# Patient Record
Sex: FEMALE | Race: WHITE
[De-identification: names, ages, dates, MRNs, and addresses within clinical notes are randomized per-mention and may not be internally consistent; named-entity substitution may affect disease eponyms.]

---

## 2017-02-25 ENCOUNTER — HOSPITAL ENCOUNTER (EMERGENCY)
Dept: HOSPITAL 80 - FED | Age: 27
Discharge: HOME | End: 2017-02-25
Payer: COMMERCIAL

## 2017-02-25 VITALS
DIASTOLIC BLOOD PRESSURE: 84 MMHG | TEMPERATURE: 98.1 F | RESPIRATION RATE: 20 BRPM | HEART RATE: 89 BPM | SYSTOLIC BLOOD PRESSURE: 115 MMHG | OXYGEN SATURATION: 97 %

## 2017-02-25 DIAGNOSIS — R00.2: Primary | ICD-10-CM

## 2017-02-25 DIAGNOSIS — J45.909: ICD-10-CM

## 2017-02-25 LAB
% IMMATURE GRANULYOCYTES: 0.3 % (ref 0–1.1)
ABSOLUTE IMMATURE GRANULOCYTES: 0.04 10^3/UL (ref 0–0.1)
ABSOLUTE NRBC COUNT: 0 10^3/UL (ref 0–0.01)
ADD DIFF?: NO
ADD MORPH?: NO
ADD SCAN?: NO
ANION GAP SERPL CALC-SCNC: 10 MEQ/L (ref 8–16)
ATYPICAL LYMPHOCYTE FLAG: 0 (ref 0–99)
CALCIUM SERPL-MCNC: 9.7 MG/DL (ref 8.5–10.4)
CHLORIDE SERPL-SCNC: 107 MEQ/L (ref 97–110)
CO2 SERPL-SCNC: 22 MEQ/L (ref 22–31)
CREAT SERPL-MCNC: 0.7 MG/DL (ref 0.6–1)
ERYTHROCYTE [DISTWIDTH] IN BLOOD BY AUTOMATED COUNT: 13.3 % (ref 11.5–15.2)
FRAGMENT RBC FLAG: 0 (ref 0–99)
GFR SERPL CREATININE-BSD FRML MDRD: > 60 ML/MIN/{1.73_M2}
GLUCOSE SERPL-MCNC: 89 MG/DL (ref 70–100)
HCT VFR BLD CALC: 42 % (ref 38–47)
HGB BLD-MCNC: 13.9 G/DL (ref 12.6–16.3)
LEFT SHIFT FLG: 0 (ref 0–99)
LIPEMIA HEMOLYSIS FLAG: 80 (ref 0–99)
MCH RBC BLDCO QN: 28.8 PG (ref 27.9–34.1)
MCHC RBC AUTO-ENTMCNC: 33.1 G/DL (ref 32.4–36.7)
MCV RBC AUTO: 87.1 FL (ref 81.5–99.8)
NRBC-AUTO%: 0 % (ref 0–0.2)
PLATELET # BLD: 264 10^3/UL (ref 150–400)
PLATELET CLUMPS FLAG: 0 (ref 0–99)
PMV BLD AUTO: 11.4 FL (ref 8.7–11.7)
POTASSIUM SERPL-SCNC: 4 MEQ/L (ref 3.5–5.2)
RBC # BLD AUTO: 4.82 10^6/UL (ref 4.18–5.33)
SODIUM SERPL-SCNC: 139 MEQ/L (ref 134–144)
TROPONIN I SERPL-MCNC: < 0.012 NG/ML (ref 0–0.03)

## 2017-02-25 NOTE — EDPHY
H & P


Time Seen by Provider: 02/25/17 18:32


HPI/ROS: 





Chief complaint.  Palpitations





HPI.  Patient is a 27-year-old female with palpitations over the past month 

gradually worsening.  She notes that her heart beats quickly over 80 16 seconds 

and then seems to stop.  It repeats at some point later.  It has been worse the 

last few days.  It occurs at rest as well as with exertion.  She is 

uncomfortable from the sense of arrhythmia but otherwise no pressure or pain in 

her chest.  No shortness of breath, fever, cough.  No similar symptoms 

previously.  No unusual leg pain or swelling.





ROS


Constitutional.  no fever/chills, no weakness


Eyes.  no problems with vision


ENT.  no sore throat, no nasal drainage


Cardiovascular.  Palpitations


Respiratory.  no shortness of breath, no cough


Abdominal.  no abdominal pain, no nausea/vomiting, no diarrhea


.  no problems urinating


MS.  no calf pain/swelling, no neck/back pain, no joint pain


Skin.  no rash


Lymph.  no swollen glands


Neuro.  no headache, no dizziness, no difficulty walking or with speech


Past Medical/Surgical History: 





Past medical history significant for asthma and ovarian cysts


Social History: 





, nonsmoker, no alcohol


Smoking Status: Never smoked


Physical Exam: 





General Appearance:  Alert pleasant well-developed female mild distress vital 

signs are stable.


Eyes: Pupils equal and round no pallor or injection.


ENT, Mouth:  Mucous membranes are moist.


Respiratory:  There are no retractions, lungs are clear to auscultation.


Cardiovascular: Regular rate and rhythm.


Gastrointestinal:   Abdomen is soft and nontender, no masses, bowel sounds 

normal.


Neurological:  Awake and alert, sensory and motor exams grossly normal.


Skin: Warm and dry, no rashes.


Musculoskeletal:  Neck is supple nontender.


Extremities  symmetrical, full range of motion.


Psychiatric: Patient is oriented X 3, there is no agitation.


Constitutional: 


 Initial Vital Signs











Temperature (C)  36.4 C   02/25/17 18:20


 


Heart Rate  90   02/25/17 18:20


 


Respiratory Rate  16   02/25/17 18:20


 


Blood Pressure  125/87 H  02/25/17 18:20


 


O2 Sat (%)  98   02/25/17 18:20








 











O2 Delivery Mode               Room Air














Allergies/Adverse Reactions: 


 





No Known Allergies Allergy (Verified 02/25/17 18:18)


 








Home Medications: 














 Medication  Instructions  Recorded


 


Albuterol  02/25/17


 


Phentermine HCl  02/25/17














Medical Decision Making





- Diagnostics


EKG Interpretation: 





EKG interpreted by me shows normal sinus rhythm.  There is normal interval and 

axis.  QRS is normal there is no significant ST elevation or depression.  There 

is no arrhythmia or ectopic beats.  Rate is 86


Imaging: 





Chest x-ray interpreted by me is normal


Procedures: 





IV normal saline, monitor


ED Course/Re-evaluation: 





Patient's D-dimer is elevated.  The patient, her , and I discussed 

laboratory evaluation recommendation for CT of her chest.  She expresses 

understanding and agreement.  CT is ordered





On re-evaluation at 8:50 p.m. patient is stable. The patient, her , and 

I discussed imaging and lab results, we discussed treatment plan including 

criteria for return and importance of follow-up and further evaluation.  They 

expressed understanding and agreement


Differential Diagnosis: 





Patient has been having palpitations.  I have considered acute coronary syndrome

, hyperthyroidism, pulmonary embolus, anxiety





- Data Points


Laboratory Results: 


 Laboratory Results





 02/25/17 18:45 





 02/25/17 18:45 





 











  02/25/17 02/25/17 02/25/17





  18:45 18:45 18:45


 


WBC      





    


 


RBC      





    


 


Hgb      





    


 


Hct      





    


 


MCV      





    


 


MCH      





    


 


MCHC      





    


 


RDW      





    


 


Plt Count      





    


 


MPV      





    


 


Neut % (Auto)      





    


 


Lymph % (Auto)      





    


 


Mono % (Auto)      





    


 


Eos % (Auto)      





    


 


Baso % (Auto)      





    


 


Nucleat RBC Rel Count      





    


 


Absolute Neuts (auto)      





    


 


Absolute Lymphs (auto)      





    


 


Absolute Monos (auto)      





    


 


Absolute Eos (auto)      





    


 


Absolute Basos (auto)      





    


 


Absolute Nucleated RBC      





    


 


Immature Gran %      





    


 


Immature Gran #      





    


 


D-Dimer      0.52 ug/mLFEU H ug/mLFEU





     (0.00-0.50) 


 


Sodium    139 mEq/L mEq/L  





    (134-144)  


 


Potassium    4.0 mEq/L mEq/L  





    (3.5-5.2)  


 


Chloride    107 mEq/L mEq/L  





    ()  


 


Carbon Dioxide    22 mEq/l mEq/l  





    (22-31)  


 


Anion Gap    10 mEq/L mEq/L  





    (8-16)  


 


BUN    8 mg/dL mg/dL  





    (7-23)  


 


Creatinine    0.7 mg/dL mg/dL  





    (0.6-1.0)  


 


Estimated GFR    > 60   





    


 


Glucose    89 mg/dL mg/dL  





    ()  


 


Calcium    9.7 mg/dL mg/dL  





    (8.5-10.4)  


 


Troponin I    < 0.012 ng/mL ng/mL  





    (0-0.034)  


 


TSH    2.080 uIU/mL uIU/mL  





    (0.465-4.680)  


 


Beta HCG, Qual  NEGATIVE     





    














  02/25/17





  18:45


 


WBC  13.65 10^3/uL H 10^3/uL





   (3.80-9.50) 


 


RBC  4.82 10^6/uL 10^6/uL





   (4.18-5.33) 


 


Hgb  13.9 g/dL g/dL





   (12.6-16.3) 


 


Hct  42.0 % %





   (38.0-47.0) 


 


MCV  87.1 fL fL





   (81.5-99.8) 


 


MCH  28.8 pg pg





   (27.9-34.1) 


 


MCHC  33.1 g/dL g/dL





   (32.4-36.7) 


 


RDW  13.3 % %





   (11.5-15.2) 


 


Plt Count  264 10^3/uL 10^3/uL





   (150-400) 


 


MPV  11.4 fL fL





   (8.7-11.7) 


 


Neut % (Auto)  72.9 % %





   (39.3-74.2) 


 


Lymph % (Auto)  18.4 % %





   (15.0-45.0) 


 


Mono % (Auto)  5.6 % %





   (4.5-13.0) 


 


Eos % (Auto)  2.4 % %





   (0.6-7.6) 


 


Baso % (Auto)  0.4 % %





   (0.3-1.7) 


 


Nucleat RBC Rel Count  0.0 % %





   (0.0-0.2) 


 


Absolute Neuts (auto)  9.96 10^3/uL H 10^3/uL





   (1.70-6.50) 


 


Absolute Lymphs (auto)  2.51 10^3/uL 10^3/uL





   (1.00-3.00) 


 


Absolute Monos (auto)  0.76 10^3/uL 10^3/uL





   (0.30-0.80) 


 


Absolute Eos (auto)  0.33 10^3/uL 10^3/uL





   (0.03-0.40) 


 


Absolute Basos (auto)  0.05 10^3/uL 10^3/uL





   (0.02-0.10) 


 


Absolute Nucleated RBC  0.00 10^3/uL 10^3/uL





   (0-0.01) 


 


Immature Gran %  0.3 % %





   (0.0-1.1) 


 


Immature Gran #  0.04 10^3/uL 10^3/uL





   (0.00-0.10) 


 


D-Dimer  





  


 


Sodium  





  


 


Potassium  





  


 


Chloride  





  


 


Carbon Dioxide  





  


 


Anion Gap  





  


 


BUN  





  


 


Creatinine  





  


 


Estimated GFR  





  


 


Glucose  





  


 


Calcium  





  


 


Troponin I  





  


 


TSH  





  


 


Beta HCG, Qual  





  














Departure





- Departure


Disposition: Home, Routine, Self-Care


Clinical Impression: 


 Palpitations





Condition: Good


Instructions:  Palpitations (ED)


Additional Instructions: 


Return for worsening symptoms including chest discomfort or trouble breathing.  

On Monday morning call Cardiology to arrange further evaluation.  Return sooner 

over the weekend for worsening symptoms


Referrals: 


NONE *PRIMARY CARE P,. [Primary Care Provider] - As per Instructions


Truman Fraire MD [Medical Doctor] - As per Instructions

## 2017-02-25 NOTE — CPEKG
Heart Rate: 86

RR Interval: 698

P-R Interval: 140

QRSD Interval: 100

QT Interval: 372

QTC Interval: 445

P Axis: 67

QRS Axis: 79

T Wave Axis: 35

EKG Severity - BORDERLINE ECG -

EKG Impression: SINUS RHYTHM

EKG Impression: INFERIOR Q WAVES, PROBABLY NORMAL VARIATION

Electronically Signed By: Ric Mann 25-Feb-2017 21:47:23

## 2019-04-09 ENCOUNTER — HOSPITAL ENCOUNTER (EMERGENCY)
Dept: HOSPITAL 80 - FED | Age: 29
Discharge: HOME | End: 2019-04-09
Payer: COMMERCIAL

## 2019-04-09 VITALS — DIASTOLIC BLOOD PRESSURE: 70 MMHG | SYSTOLIC BLOOD PRESSURE: 122 MMHG

## 2019-04-09 DIAGNOSIS — R06.02: ICD-10-CM

## 2019-04-09 DIAGNOSIS — R07.89: Primary | ICD-10-CM

## 2019-04-09 NOTE — CPEKG
Test Reason : OPEN

Blood Pressure : ***/*** mmHG

Vent. Rate : 069 BPM     Atrial Rate : 070 BPM

   P-R Int : 151 ms          QRS Dur : 105 ms

    QT Int : 399 ms       P-R-T Axes : 046 068 027 degrees

   QTc Int : 428 ms

 

Sinus rhythm

 

Confirmed by Brendan Murcia ([id number]) on 4/9/2019 7:45:07 PM

 

Referred By: PHYSICIAN ED           Confirmed By:Brendan Murcia

## 2019-04-09 NOTE — EDPHY
H & P


Stated Complaint: hx PAC and mid chest tightness and feels PAC and diarrhea


Time Seen by Provider: 04/09/19 19:43


HPI/ROS: 





CHIEF COMPLAINT:  Chest pressure





HISTORY OF PRESENT ILLNESS:  The patient presents the ED with a 1 day history 

of chest pressure.  She reports that she is under fair amount of stress 

secondary to the recent purchase of a house and plans to travel with her 

family.  She described a sensation earlier today of chest discomfort.  She had 

mild shortness of breath.  Her symptoms have improved.  The patient has no 

history of exertional chest pain or shortness of breath.  The patient does have 

a history of PACs which typically manifest as a skipped beat.  The patient 

denies any fever, cough or congestion.  She denies asymmetric calf pain or 

swelling.  She denies pleuritic chest pain.  The patient reports that her 

father does have a history of coronary artery disease.  She does believe that 

he had a stent sometime in his mid 50s.





REVIEW OF SYSTEMS:


A comprehensive 10 point review of systems is otherwise negative aside from 

elements mentioned in the history of present illness.


Source: Patient





- Personal History


LMP (Females 10-55): 22-28 Days Ago


Current Tetanus/Diphtheria Vaccine: Yes


Current Tetanus Diphtheria and Acellular Pertussis (TDAP): Yes





- Medical/Surgical History


Hx Asthma: Yes


Hx Chronic Respiratory Disease: No


Hx Diabetes: No


Hx Cardiac Disease: No


Hx Renal Disease: No


Hx Cirrhosis: No


Hx Alcoholism: No


Hx HIV/AIDS: No


Hx Splenectomy or Spleen Trauma: No


Other PMH: Ovarian cyst, childhood asthma, PAC





- Social History


Smoking Status: Never smoked





- Physical Exam


Exam: 





General Appearance:  Alert, no distress


Eyes:  Pupils equal and round no pallor or injection


ENT, Mouth:  Mucous membranes moist


Respiratory:  There are no retractions, lungs are clear to auscultation


Cardiovascular:  Regular rate and rhythm


Gastrointestinal:  Abdomen is soft and nontender, no masses, bowel sounds normal


Neurological:  A&O, normal motor function, normal sensory exam, normal cranial 

nerves


Skin:  Warm and dry, no rashes


Musculoskeletal:  Neck is supple nontender


Extremities:  symmetrical, full range of motion








Constitutional: 


 Initial Vital Signs











Temperature (C)  37.4 C   04/09/19 19:21


 


Heart Rate  78   04/09/19 19:21


 


Respiratory Rate  16   04/09/19 19:21


 


Blood Pressure  145/87 H  04/09/19 19:21


 


O2 Sat (%)  97   04/09/19 19:21








 











O2 Delivery Mode               Room Air














Allergies/Adverse Reactions: 


 





No Known Allergies Allergy (Verified 04/09/19 19:24)


 








Home Medications: 














 Medication  Instructions  Recorded


 


Propranolol HCl  04/09/19














Medical Decision Making





- Diagnostics


EKG Interpretation: 





EKG:  Complete interpretation has been separately recorded in the [employer] 

archive.  Summary impression:  Sinus rhythm, rate 69, no arrhythmia or ischemic 

changes noted.


ED Course/Re-evaluation: 





Patient presents the ED with atypical chest pain for the past day in the 

setting of sure amount of stress.  The patient's EKG demonstrates no evidence 

of ischemia.  The patient's troponin is normal.  The patient has a heart score 

of 1-2.  The patient is comfortable being discharged home and following up with 

Cardiology for consideration of a treadmill stress test.  She does understand 

return to the ED for markedly worsening symptoms or other concerns.








Differential Diagnosis: 





Differential diagnosis considered includes acute coronary syndrome, pericarditis

, esophageal spasm, situational anxiety





- Data Points


Laboratory Results: 


 











  04/09/19





  19:59


 


POC Troponin I  0.00 ng/mL ng/mL





   (0.00-0.08) 











Point of Care Test Results: 


 Chemistry











  04/09/19





  19:59


 


POC Troponin I  0.00 ng/mL ng/mL





   (0.00-0.08) 














Departure





- Departure


Disposition: Home, Routine, Self-Care


Clinical Impression: 


 Chest pain





Condition: Good


Instructions:  Chest Pain (ED)


Additional Instructions: 


1. Based upon the testing done in the Emergency Department today we see no 

evidence of a heart attack.


2. We are unable to fully exclude coronary artery disease based upon the 

testing available in the Emergency Department.


3. For this reason, we would like you to be seen by cardiology for 

consideration of additional testing within the next 3 days.


4. Please contact the cardiologist you have been referred to schedule this 

appointment as soon as possible. Their offices are typically open from 8:30am-

5pm M-F. 


5. Please return to the Emergency Department immediately for any recurrent 

chest pain, difficulty breathing or other concerns.











Referrals: 


Tera Young MD [Medical Doctor] - As per Instructions